# Patient Record
Sex: MALE | Race: WHITE | URBAN - METROPOLITAN AREA
[De-identification: names, ages, dates, MRNs, and addresses within clinical notes are randomized per-mention and may not be internally consistent; named-entity substitution may affect disease eponyms.]

---

## 2017-08-23 ENCOUNTER — OFFICE VISIT (OUTPATIENT)
Dept: INTERNAL MEDICINE CLINIC | Age: 32
End: 2017-08-23

## 2017-08-23 VITALS
RESPIRATION RATE: 16 BRPM | WEIGHT: 193 LBS | DIASTOLIC BLOOD PRESSURE: 80 MMHG | HEIGHT: 73 IN | BODY MASS INDEX: 25.58 KG/M2 | SYSTOLIC BLOOD PRESSURE: 128 MMHG | HEART RATE: 74 BPM

## 2017-08-23 DIAGNOSIS — F51.02 ADJUSTMENT INSOMNIA: ICD-10-CM

## 2017-08-23 DIAGNOSIS — Z00.00 ANNUAL PHYSICAL EXAM: Primary | ICD-10-CM

## 2017-08-23 PROCEDURE — 99385 PREV VISIT NEW AGE 18-39: CPT | Performed by: INTERNAL MEDICINE

## 2017-08-23 RX ORDER — TRAZODONE HYDROCHLORIDE 50 MG/1
TABLET ORAL
Qty: 30 TABLET | Refills: 0 | Status: SHIPPED | OUTPATIENT
Start: 2017-08-23 | End: 2019-03-12

## 2017-09-27 ENCOUNTER — HOSPITAL ENCOUNTER (OUTPATIENT)
Dept: GENERAL RADIOLOGY | Age: 32
Discharge: HOME | End: 2017-09-27
Attending: INTERNAL MEDICINE | Admitting: INTERNAL MEDICINE

## 2017-09-27 DIAGNOSIS — Z00.00 ANNUAL PHYSICAL EXAM: ICD-10-CM

## 2017-09-27 LAB
A/G RATIO: 1.4 (ref 1.1–2.2)
ALBUMIN SERPL-MCNC: 4.5 G/DL (ref 3.4–5)
ALP BLD-CCNC: 53 U/L (ref 40–129)
ALT SERPL-CCNC: 23 U/L (ref 10–40)
ANION GAP SERPL CALCULATED.3IONS-SCNC: 17 MMOL/L (ref 3–16)
AST SERPL-CCNC: 21 U/L (ref 15–37)
BASOPHILS ABSOLUTE: 0 K/UL (ref 0–0.2)
BASOPHILS RELATIVE PERCENT: 0.5 %
BILIRUB SERPL-MCNC: 0.7 MG/DL (ref 0–1)
BUN BLDV-MCNC: 15 MG/DL (ref 7–20)
CALCIUM SERPL-MCNC: 9.6 MG/DL (ref 8.3–10.6)
CHLORIDE BLD-SCNC: 101 MMOL/L (ref 99–110)
CHOLESTEROL, TOTAL: 204 MG/DL (ref 0–199)
CO2: 25 MMOL/L (ref 21–32)
CREAT SERPL-MCNC: 0.9 MG/DL (ref 0.9–1.3)
EOSINOPHILS ABSOLUTE: 0.1 K/UL (ref 0–0.6)
EOSINOPHILS RELATIVE PERCENT: 1.9 %
GFR AFRICAN AMERICAN: >60
GFR NON-AFRICAN AMERICAN: >60
GLOBULIN: 3.2 G/DL
GLUCOSE BLD-MCNC: 89 MG/DL (ref 70–99)
HCT VFR BLD CALC: 46 % (ref 40.5–52.5)
HDLC SERPL-MCNC: 41 MG/DL (ref 40–60)
HEMOGLOBIN: 15.8 G/DL (ref 13.5–17.5)
LDL CHOLESTEROL CALCULATED: 144 MG/DL
LYMPHOCYTES ABSOLUTE: 2.8 K/UL (ref 1–5.1)
LYMPHOCYTES RELATIVE PERCENT: 37.9 %
MCH RBC QN AUTO: 31.1 PG (ref 26–34)
MCHC RBC AUTO-ENTMCNC: 34.3 G/DL (ref 31–36)
MCV RBC AUTO: 90.7 FL (ref 80–100)
MONOCYTES ABSOLUTE: 0.8 K/UL (ref 0–1.3)
MONOCYTES RELATIVE PERCENT: 11.3 %
NEUTROPHILS ABSOLUTE: 3.6 K/UL (ref 1.7–7.7)
NEUTROPHILS RELATIVE PERCENT: 48.4 %
PDW BLD-RTO: 13 % (ref 12.4–15.4)
PLATELET # BLD: 199 K/UL (ref 135–450)
PMV BLD AUTO: 8.7 FL (ref 5–10.5)
POTASSIUM SERPL-SCNC: 4.1 MMOL/L (ref 3.5–5.1)
RBC # BLD: 5.08 M/UL (ref 4.2–5.9)
SODIUM BLD-SCNC: 143 MMOL/L (ref 136–145)
TOTAL PROTEIN: 7.7 G/DL (ref 6.4–8.2)
TRIGL SERPL-MCNC: 94 MG/DL (ref 0–150)
VLDLC SERPL CALC-MCNC: 19 MG/DL
WBC # BLD: 7.4 K/UL (ref 4–11)

## 2019-03-12 ENCOUNTER — OFFICE VISIT (OUTPATIENT)
Dept: INTERNAL MEDICINE CLINIC | Age: 34
End: 2019-03-12
Payer: COMMERCIAL

## 2019-03-12 VITALS
HEART RATE: 72 BPM | HEIGHT: 73 IN | SYSTOLIC BLOOD PRESSURE: 124 MMHG | BODY MASS INDEX: 25.05 KG/M2 | DIASTOLIC BLOOD PRESSURE: 68 MMHG | OXYGEN SATURATION: 98 % | WEIGHT: 189 LBS

## 2019-03-12 DIAGNOSIS — F43.22 ADJUSTMENT DISORDER WITH ANXIOUS MOOD: ICD-10-CM

## 2019-03-12 DIAGNOSIS — Z00.00 ANNUAL PHYSICAL EXAM: Primary | ICD-10-CM

## 2019-03-12 LAB
A/G RATIO: 1.6 (ref 1.1–2.2)
ALBUMIN SERPL-MCNC: 4.9 G/DL (ref 3.4–5)
ALP BLD-CCNC: 58 U/L (ref 40–129)
ALT SERPL-CCNC: 23 U/L (ref 10–40)
ANION GAP SERPL CALCULATED.3IONS-SCNC: 15 MMOL/L (ref 3–16)
AST SERPL-CCNC: 16 U/L (ref 15–37)
BASOPHILS ABSOLUTE: 0 K/UL (ref 0–0.2)
BASOPHILS RELATIVE PERCENT: 0.5 %
BILIRUB SERPL-MCNC: 0.5 MG/DL (ref 0–1)
BUN BLDV-MCNC: 12 MG/DL (ref 7–20)
CALCIUM SERPL-MCNC: 10.2 MG/DL (ref 8.3–10.6)
CHLORIDE BLD-SCNC: 103 MMOL/L (ref 99–110)
CHOLESTEROL, TOTAL: 206 MG/DL (ref 0–199)
CO2: 27 MMOL/L (ref 21–32)
CREAT SERPL-MCNC: 0.9 MG/DL (ref 0.9–1.3)
EOSINOPHILS ABSOLUTE: 0.1 K/UL (ref 0–0.6)
EOSINOPHILS RELATIVE PERCENT: 1.6 %
GFR AFRICAN AMERICAN: >60
GFR NON-AFRICAN AMERICAN: >60
GLOBULIN: 3 G/DL
GLUCOSE BLD-MCNC: 99 MG/DL (ref 70–99)
HCT VFR BLD CALC: 45.7 % (ref 40.5–52.5)
HDLC SERPL-MCNC: 38 MG/DL (ref 40–60)
HEMOGLOBIN: 15.7 G/DL (ref 13.5–17.5)
LDL CHOLESTEROL CALCULATED: 140 MG/DL
LYMPHOCYTES ABSOLUTE: 1.8 K/UL (ref 1–5.1)
LYMPHOCYTES RELATIVE PERCENT: 34.5 %
MCH RBC QN AUTO: 31.4 PG (ref 26–34)
MCHC RBC AUTO-ENTMCNC: 34.4 G/DL (ref 31–36)
MCV RBC AUTO: 91.3 FL (ref 80–100)
MONOCYTES ABSOLUTE: 0.4 K/UL (ref 0–1.3)
MONOCYTES RELATIVE PERCENT: 7.5 %
NEUTROPHILS ABSOLUTE: 3 K/UL (ref 1.7–7.7)
NEUTROPHILS RELATIVE PERCENT: 55.9 %
PDW BLD-RTO: 13 % (ref 12.4–15.4)
PLATELET # BLD: 251 K/UL (ref 135–450)
PMV BLD AUTO: 9 FL (ref 5–10.5)
POTASSIUM SERPL-SCNC: 4.3 MMOL/L (ref 3.5–5.1)
RBC # BLD: 5 M/UL (ref 4.2–5.9)
SODIUM BLD-SCNC: 145 MMOL/L (ref 136–145)
TOTAL PROTEIN: 7.9 G/DL (ref 6.4–8.2)
TRIGL SERPL-MCNC: 142 MG/DL (ref 0–150)
TSH REFLEX: 3.69 UIU/ML (ref 0.27–4.2)
VLDLC SERPL CALC-MCNC: 28 MG/DL
WBC # BLD: 5.4 K/UL (ref 4–11)

## 2019-03-12 PROCEDURE — 99213 OFFICE O/P EST LOW 20 MIN: CPT | Performed by: INTERNAL MEDICINE

## 2019-03-12 PROCEDURE — 99395 PREV VISIT EST AGE 18-39: CPT | Performed by: INTERNAL MEDICINE

## 2019-03-12 RX ORDER — CITALOPRAM 20 MG/1
20 TABLET ORAL DAILY
Qty: 30 TABLET | Refills: 0 | Status: SHIPPED | OUTPATIENT
Start: 2019-03-12 | End: 2019-05-07 | Stop reason: SINTOL

## 2019-03-12 ASSESSMENT — PATIENT HEALTH QUESTIONNAIRE - PHQ9
SUM OF ALL RESPONSES TO PHQ QUESTIONS 1-9: 0
1. LITTLE INTEREST OR PLEASURE IN DOING THINGS: 0
SUM OF ALL RESPONSES TO PHQ9 QUESTIONS 1 & 2: 0
SUM OF ALL RESPONSES TO PHQ QUESTIONS 1-9: 0
2. FEELING DOWN, DEPRESSED OR HOPELESS: 0

## 2019-03-26 ENCOUNTER — TELEPHONE (OUTPATIENT)
Dept: PSYCHOLOGY | Age: 34
End: 2019-03-26

## 2019-03-26 ENCOUNTER — OFFICE VISIT (OUTPATIENT)
Dept: PSYCHOLOGY | Age: 34
End: 2019-03-26
Payer: COMMERCIAL

## 2019-03-26 DIAGNOSIS — F41.8 DEPRESSION WITH ANXIETY: Primary | ICD-10-CM

## 2019-03-26 PROCEDURE — 90791 PSYCH DIAGNOSTIC EVALUATION: CPT | Performed by: SOCIAL WORKER

## 2019-03-26 ASSESSMENT — PATIENT HEALTH QUESTIONNAIRE - PHQ9
2. FEELING DOWN, DEPRESSED OR HOPELESS: 1
SUM OF ALL RESPONSES TO PHQ QUESTIONS 1-9: 2
SUM OF ALL RESPONSES TO PHQ QUESTIONS 1-9: 2
SUM OF ALL RESPONSES TO PHQ9 QUESTIONS 1 & 2: 2
1. LITTLE INTEREST OR PLEASURE IN DOING THINGS: 1

## 2019-04-02 ENCOUNTER — OFFICE VISIT (OUTPATIENT)
Dept: INTERNAL MEDICINE CLINIC | Age: 34
End: 2019-04-02
Payer: COMMERCIAL

## 2019-04-02 VITALS
HEART RATE: 78 BPM | HEIGHT: 73 IN | WEIGHT: 189 LBS | SYSTOLIC BLOOD PRESSURE: 116 MMHG | OXYGEN SATURATION: 99 % | BODY MASS INDEX: 25.05 KG/M2 | DIASTOLIC BLOOD PRESSURE: 66 MMHG

## 2019-04-02 DIAGNOSIS — F43.22 ADJUSTMENT DISORDER WITH ANXIOUS MOOD: Primary | ICD-10-CM

## 2019-04-02 DIAGNOSIS — F51.02 ADJUSTMENT INSOMNIA: ICD-10-CM

## 2019-04-02 PROCEDURE — 99213 OFFICE O/P EST LOW 20 MIN: CPT | Performed by: INTERNAL MEDICINE

## 2019-04-02 RX ORDER — DULOXETIN HYDROCHLORIDE 30 MG/1
30 CAPSULE, DELAYED RELEASE ORAL DAILY
Qty: 30 CAPSULE | Refills: 0 | Status: SHIPPED | OUTPATIENT
Start: 2019-04-02 | End: 2019-05-07 | Stop reason: SDUPTHER

## 2019-04-02 NOTE — PROGRESS NOTES
Kaden Jorge  YOB: 1985    Date of Service:  4/2/2019    Chief Complaint:      Chief Complaint   Patient presents with    Anxiety       HPI:  Kaden Jorge is a 34 y.o. Adjustment d/o:  Not been irritated or having anger spells much with start of Celexa 20 mg qd. He is having problems with having orgasm, no problems with having an erection with it. Sleeping better with exercising more and not stay up too late playing video games. No results found for: Mary Sernancy  Lab Results   Component Value Date     03/12/2019    K 4.3 03/12/2019     03/12/2019    CO2 27 03/12/2019    BUN 12 03/12/2019    CREATININE 0.9 03/12/2019    GLUCOSE 99 03/12/2019    CALCIUM 10.2 03/12/2019     Lab Results   Component Value Date    CHOL 206 03/12/2019    TRIG 142 03/12/2019    HDL 38 03/12/2019    LDLCALC 140 03/12/2019     Lab Results   Component Value Date    ALT 23 03/12/2019    AST 16 03/12/2019     No results found for: TSH, T4FREE  Lab Results   Component Value Date    WBC 5.4 03/12/2019    HGB 15.7 03/12/2019    HCT 45.7 03/12/2019    MCV 91.3 03/12/2019     03/12/2019     No results found for: INR   No results found for: PSA   No results found for: LABURIC     Patient Active Problem List   Diagnosis    Adjustment insomnia    Adjustment disorder with anxious mood       No Known Allergies  Outpatient Medications Marked as Taking for the 4/2/19 encounter (Office Visit) with Melisa Noble MD   Medication Sig Dispense Refill    citalopram (CELEXA) 20 MG tablet Take 1 tablet by mouth daily Start with 1/2 pill for 6-8 days and increase to 1 qd as tolerated 30 tablet 0         Review of Systems: 14 systems were negative except of what was stated on HPI    Nursing note and vitals reviewed.     Vitals:    04/02/19 1024   BP: 116/66   Pulse: 78   SpO2: 99%   Weight: 189 lb (85.7 kg)   Height: 6' 1\" (1.854 m)     Wt Readings from Last 3 Encounters:   04/02/19 189 lb (85.7 kg)   03/12/19 189 lb (85.7 kg)   08/23/17 193 lb (87.5 kg)     BP Readings from Last 3 Encounters:   04/02/19 116/66   03/12/19 124/68   08/23/17 128/80     Body mass index is 24.94 kg/m². Constitutional: Patient appears well-developed and well-nourished. No distress. Head: Normocephalic and atraumatic. Neck: Normal range of motion. Neck supple. No thyroidmegaly. Cardiovascular: Normal rate, regular rhythm, normal heart sounds and intact distal pulses. Pulmonary/Chest: Effort normal and breath sounds normal. No stridor. No respiratory distress. No wheezes and no rales. Abdominal: Soft. Bowel sounds are normal. No distension and no mass. No tenderness. No rebound and no guarding. Musculoskeletal: No edema and no tenderness. Skin: No rash or erythema. Psychiatric: Normal mood and affect. Behavior is normal.     Assessment/Plan:  Thien Drake was seen today for anxiety. Diagnoses and all orders for this visit:    Adjustment disorder with anxious mood  Wean Celexa 20 mg down to 1/2 qd for 6-8 days and if still had climax issues, start on Cymbalta  Start DULoxetine (CYMBALTA) 30 MG extended release capsule; Take 1 capsule by mouth daily    Adjustment insomnia  Continue to exercise and go to bed at a regular time       Return 1 month on anxiety.

## 2019-04-16 ENCOUNTER — OFFICE VISIT (OUTPATIENT)
Dept: PSYCHOLOGY | Age: 34
End: 2019-04-16
Payer: COMMERCIAL

## 2019-04-16 DIAGNOSIS — F41.8 DEPRESSION WITH ANXIETY: Primary | ICD-10-CM

## 2019-04-16 PROCEDURE — 90832 PSYTX W PT 30 MINUTES: CPT | Performed by: SOCIAL WORKER

## 2019-04-16 ASSESSMENT — PATIENT HEALTH QUESTIONNAIRE - PHQ9
2. FEELING DOWN, DEPRESSED OR HOPELESS: 0
SUM OF ALL RESPONSES TO PHQ QUESTIONS 1-9: 0
1. LITTLE INTEREST OR PLEASURE IN DOING THINGS: 0
SUM OF ALL RESPONSES TO PHQ QUESTIONS 1-9: 0
SUM OF ALL RESPONSES TO PHQ9 QUESTIONS 1 & 2: 0

## 2019-04-16 NOTE — PATIENT INSTRUCTIONS
1.  Try \"smell the isabella and blow and the candle\" with Judeth Mediate  2. Continue with your plan to get exercise in  3. Youtube Dr. Anna Snyder 4-7-8  4. Return to see Huong Mast in 3 weeks. The Stress Response and How It Can Affect You   The stress response, or fight or flight response is the emergency reaction system of the body. It is there to keep you safe in emergencies. The stress response includes physical and thought responses to your perception of various situations. When the stress response is turned on, your body may release substances like adrenaline and cortisol. Your organs are programmed to respond in certain ways to situations that are viewed as challenging or threatening. The stress response can work against you. You can turn it on when you dont really need it and, as a result, perceive something as an emergency when its really not. It can turn on when you are just thinking about past or future events. Harmless, chronic conditions can be intensified by the stress response activating too often, with too much intensity, or for too long. Stress responses can be different for different individuals. Below is a list of some common stress related responses people have. (Keene the responses you have had in the last 2 weeks.)     Physical Responses   Muscle aches   Insomnia   ? Heart rate   Headache   Weight gain   Nausea   Constipation   Dry mouth   Muscle twitching  Weight loss   Low energy   Weakness   Tight chest   Diarrhea   Dizziness   Trembling   Stomach cramps  Chills    Hot flashes   Sweating   Pounding heart  Choking feeling  Chest pain   Leg cramps   Numb hands/feet Dry throat   Appetite change  Face flushing   ? Blood pressure  Light-headedness  Feeling faint      Troubleswallowing   Rash ?    Urination   Neck pain     Tingling hands/feet     Emotional and Thought Responses   Restlessness   Agitation   Insecurity            Worthlessness   Anxiety   Stress    Depression Hopelessness   Guilt    Defensiveness  Anger           Racing thoughts   Nightmares   Intense thinking  Sensitivity          Expecting the worst   Numbness   Lack of motivation  Mood swings             Forgetfulness   ? Concentration  Rigidity              Preoccupation  Intolerance     Behavioral Responses   Avoidance   Withdrawal   Neglect   ? Alcohol use    Smoking   ? Eating   Arguing       Poor appearance   ? Spending   Poor hygiene   ? Eating  Seeking reassurance   Nail biting   Skin picking   ? Talking        ? Body checking   Sexual problems  Foot tapping  Fidgeting Rapid walking    ? Exercise   Teeth clenching          Multitasking  Aggressive speaking       ? Fun activities  ? Sleeping       ? Relaxing activities     Seeking information     The parasympathetic nervous system in your body is designed to turn on your bodys relaxation response. Your behaviors and thinking can keep your bodys natural relaxation response from operating at its best.   Getting your body to relax on a daily basis for at least brief periods can help decrease unpleasant stress responses. Learning to relax your body, through specific breathing and relaxation exercises as well as by minimizing stressful thinking, can help your bodys natural relaxation system be more effective. Relaxation:  Diaphragmatic Breathing             ______________________________________________________________________________    1. Sit in a comfortable position    2. Place one hand on your stomach and the other on your chest    3. Try to breathe so that only your stomach rises and falls    As you inhale, concentrate on your chest remaining relatively still while your stomach rises. It may be helpful for you to imagine that your pants are too big and you need to push your stomach out to hold them up. When exhaling, allow your stomach to fall in and the air to fully escape. Inhale slowly. You may choose to hold the air in for about a second. Exhale slowly. Dont push the air out, but just let the natural pressure of your body slowly move it out. It is normal for this healthy method of breathing to feel a little awkward at first.  With practice, it will feel more natural.    4. Get your mind on your side    One other important factor in getting relaxed is your mind. Your mind and body are connected. The mind influences the body and the body influences the mind. What you do with your mind when you are trying to relax is very important. The key is to avoid thinking about stressful things. You can think about      Neutral things (e.g., counting, saying a word like calm or relax)   Pleasant things (e.g., imagining a pleasant place)    5. It is recommended that you practice 2 times per day, 10 minutes each time. Deep Breathing       What Is Deep Breathing? Deep breathing involves using your diaphragm muscle to help bring about a state of physiological relaxation. The diaphragm is a large muscle that rests across the bottom of your rib cage. When you inhale, the diaphragm muscle drops, opening up space so air can come in. When watching someone do this it looks like your stomach is filling with air. This type of breathing helps activate the part of your nervous system that controls relaxation. It can lead to decreased heart rate, blood pressure, decreased muscle tension, and overall feelings of relaxation. Why Be Concerned With How Im Breathing?  To increase your awareness of the role that breathing plays in increased physical tension and in contributing to increasing your bodys stress response.  To lower your level of stress-related arousal and tension.  To give you a method of taking calm, relaxing breaths to break the cycle of increasing arousal during stressful situations. What Is the Best Way To Use Deep Breathing Exercises?  Use deep breathing frequently.     Take deep breaths at the first signs of stress, anxiety, physical tension, or other symptoms.  Schedule time for relaxation. Deep Breathing Exercises      Exercise 1:  The Stimulating Breath (also called the Alejandra Breath)   Its aim is to raise energy level and increase alertness. - Inhale and exhale rapidly through your nose, keeping your mouth closed but relaxed. Your breaths in and out should be equal in duration, but as short as possible. This is a noisy breathing exercise. - Try for three in-and-out breath cycles per second. This produces a quick movement of the diaphragm, suggesting a alejandra. Breathe normally after each cycle. - Do not do for more than 15 seconds on your first try. Each time you practice the Stimulating Breath, you can increase your time by five seconds or so, until you reach a full minute. If done properly, you may feel invigorated, comparable to the heightened awareness you feel after a good workout. You should feel the effort at the back of the neck, the diaphragm, the chest and the abdomen. Try this breathing exercise the next time you need an energy boost and feel yourself reaching for a cup of coffee. Exercise 2:  The 4-7-8 (or Relaxing Breath) Exercise  This exercise is utterly simple, takes almost no time, requires no equipment and can be done anywhere. Although you can do the exercise in any position, sit with your back straight while learning the exercise. Place the tip of your tongue against the ridge of tissue just behind your upper front teeth, and keep it there through the entire exercise. You will be exhaling through your mouth around your tongue; try pursing your lips slightly if this seems awkward.  Exhale completely through your mouth, making a whoosh sound.  Close your mouth and inhale quietly through your nose to a mental count of four.  Hold your breath for a count of seven.  Exhale completely through your mouth, making a whoosh sound to a count of eight.  This is one breath.  Now inhale again and repeat the cycle three more times for a total of four breaths. Note that you always inhale quietly through your nose and exhale audibly through your mouth. The tip of your tongue stays in position the whole time. Exhalation takes twice as long as inhalation. The absolute time you spend on each phase is not important; the ratio of 4:7:8 is important. If you have trouble holding your breath, speed the exercise up but keep to the ratio of 4:7:8 for the three phases. With practice you can slow it all down and get used to inhaling and exhaling more and more deeply. This exercise is a natural tranquilizer for the nervous system. Unlike tranquilizing drugs, which are often effective when you first take them but then lose their power over time, this exercise is subtle when you first try it but gains in power with repetition and practice. Do it at least twice a day. You cannot do it too frequently. Do NOT do more than 4 breaths at one time for the first month of practice. Later, if you wish, you can extend it to eight breaths. If you feel a little lightheaded when you first breathe this way, do not be concerned; it will pass. Once you develop this technique by practicing it every day, it will be a very useful tool that you will always have with you. Use it whenever anything upsetting happens - before you react. Use it whenever you are aware of internal tension. Use it to help you fall asleep. This exercise cannot be recommended too highly. Everyone can benefit from it. Exercise 3: Meditation exercise  Breath Counting  If you want to get a feel for this challenging work, try your hand at breath counting, a deceptively simple technique. Sit in a comfortable position with the spine straight and head inclined slightly forward. Gently close your eyes and take a few deep breaths. Then let the breath come naturally without trying to influence it. Ideally it will be quiet and slow, but depth and rhythm may vary.    To begin the exercise, count \"one\" to yourself as you exhale.  The next time you exhale, count \"two,\" and so on up to [place]. \"   Then begin a new cycle, counting \"one\" on the next exhalation. Never count higher than \"five,\" and count only when you exhale. You will know your attention has wandered when you find yourself up to \"eight,\" \"12,\" even \"19. \"  Try to do 10 minutes of this form of meditation.

## 2019-04-16 NOTE — PROGRESS NOTES
Behavioral Health Consultation  Juliana Agee, [address]  4/16/2019  9:55 AM      Time spent with Patient: 30 minutes  This is patient's second  [facility] appointment. Reason for Consult:  anxiety  Referring Provider: Nathaly Noble MD  [address] [place] [address] [facility], [address] [place] [address]    Pt provided informed consent for the behavioral health program. Discussed with patient model of service to include the limits of confidentiality (i.e. abuse reporting, suicide intervention, etc.) and short-term intervention focused approach. Pt indicated understanding. Feedback given to PCP. S:  Pt endorses that he is doing really great, especially coming off of vacation. He has had a lot of time to reflect on things. Daughter was wonderful, trip to [place] could not have been better. He realized he needs to be more active with his daughter. The whole family has been more active. He also took some time from his carley, and has altered his sleep patterns to get to bed earlier. He is wanting more sleep, and limiting game time which helps the next day. Pt liked the PMR and found it to be really helpful. He was still able to pay attention to his mindfulness in the mornings. Pt is having a much better response to Cymbalta, no sexual side effects and is managing his anxiety. He is still being able to feel emotion. No SI/HI.        O:    MSE:    Appearance    alert, cooperative  Appetite normal  Sleep disturbance Yes  Fatigue Yes  Loss of pleasure No  Impulsive behavior No  Speech    spontaneous, normal rate and normal volume  Mood    euthymic  Affect    normal affect  Thought Content    intact  Thought Process    linear, goal directed and coherent  Associations    logical connections  Insight    Good  Judgment    Intact  Orientation    oriented to person, place, time, and general circumstances  Memory    recent and remote memory intact  Attention/Concentration    intact  Morbid ideation No  Suicide Assessment    no suicidal ideation      History:    Medications:   Current Outpatient Medications   Medication Sig Dispense Refill    DULoxetine (CYMBALTA) 30 MG extended release capsule Take 1 capsule by mouth daily 30 capsule 0    citalopram (CELEXA) 20 MG tablet Take 1 tablet by mouth daily Start with 1/2 pill for 6-8 days and increase to 1 qd as tolerated 30 tablet 0     No current facility-administered medications for this visit. Social History:   Social History     Socioeconomic History    Marital status:      Spouse name: Not on file    Number of children: Not on file    Years of education: Not on file    Highest education level: Not on file   Occupational History    Not on file   Social Needs    Financial resource strain: Not on file    Food insecurity:     Worry: Not on file     Inability: Not on file    Transportation needs:     Medical: Not on file     Non-medical: Not on file   Tobacco Use    Smoking status: Never Smoker    Smokeless tobacco: Never Used   Substance and Sexual Activity    Alcohol use: Yes     Comment: rare    Drug use: Yes     Types: Marijuana     Comment: twice a week    Sexual activity: Yes     Partners: Female   Lifestyle    Physical activity:     Days per week: Not on file     Minutes per session: Not on file    Stress: Not on file   Relationships    Social connections:     Talks on phone: Not on file     Gets together: Not on file     Attends [employer] service: Not on file     Active member of club or organization: Not on file     Attends meetings of clubs or organizations: Not on file     Relationship status: Not on file    Intimate partner violence:     Fear of current or ex partner: Not on file     Emotionally abused: Not on file     Physically abused: Not on file     Forced sexual activity: Not on file   Other Topics Concern    Not on file   Social History Narrative    Not on file       TOBACCO:   reports that he has never smoked.  He has never used smokeless tobacco.  ETOH:   reports that he drinks alcohol. Family History:   Family History   Problem Relation Age of Onset    Asthma Mother     Thyroid Disease Mother     Anxiety Disorder Mother     Depression Mother     Heart Attack Father     High Cholesterol Father     Cancer Father         skin     Alcohol Abuse Father     Other Father         AAA    Asthma Brother     Alcohol Abuse Brother     Arthritis Maternal Grandmother     Mult Sclerosis Maternal Grandfather     Other Paternal Grandmother         aortic aneurysm     PHQ Scores 4/16/2019 3/26/2019 3/12/2019   PHQ2 Score 0 2 0   PHQ9 Score 0 2 0     Interpretation of Total Score Depression Severity: 1-4 = Minimal depression, 5-9 = Mild depression, 10-14 = Moderate depression, 15-19 = Moderately severe depression, 20-27 = Severe depression      A:  Pt endorses sig improvement in mood. Good response to Cymbalta, resolved sexual SE. Improving self care, which has been impactful in managing mood. No SI/HI, insight and motivation good. Diagnosis:  Depression with anxiety      Past Diagnosis:  No past medical history on file. No diagnosis found.       Plan:  Pt interventions:  Provided handout on  anxiety and breathing, Trained in strategies for increasing balanced thinking, Discussed and set plan for behavioral activation, Trained in relaxation strategies, Trained in improving communication skills, Discussed self-care (sleep, nutrition, rewarding activities, social support, exercise), Motivational Interviewing to increase patient confidence and compliance with adhering to behavioral change plan, Motivational Interviewing to determine importance and readiness for change, Supportive techniques and Emphasized self-care as important for managing overall health      Pt Behavioral Change Plan:  See Pt Instructions

## 2019-05-07 ENCOUNTER — OFFICE VISIT (OUTPATIENT)
Dept: PSYCHOLOGY | Age: 34
End: 2019-05-07
Payer: COMMERCIAL

## 2019-05-07 ENCOUNTER — OFFICE VISIT (OUTPATIENT)
Dept: INTERNAL MEDICINE CLINIC | Age: 34
End: 2019-05-07
Payer: COMMERCIAL

## 2019-05-07 VITALS
DIASTOLIC BLOOD PRESSURE: 60 MMHG | BODY MASS INDEX: 25.58 KG/M2 | HEIGHT: 73 IN | WEIGHT: 193 LBS | SYSTOLIC BLOOD PRESSURE: 114 MMHG | OXYGEN SATURATION: 98 % | HEART RATE: 56 BPM

## 2019-05-07 DIAGNOSIS — F43.22 ADJUSTMENT DISORDER WITH ANXIOUS MOOD: ICD-10-CM

## 2019-05-07 DIAGNOSIS — F41.8 DEPRESSION WITH ANXIETY: Primary | ICD-10-CM

## 2019-05-07 PROCEDURE — 99213 OFFICE O/P EST LOW 20 MIN: CPT | Performed by: INTERNAL MEDICINE

## 2019-05-07 PROCEDURE — 90832 PSYTX W PT 30 MINUTES: CPT | Performed by: SOCIAL WORKER

## 2019-05-07 RX ORDER — DULOXETIN HYDROCHLORIDE 30 MG/1
30 CAPSULE, DELAYED RELEASE ORAL DAILY
Qty: 90 CAPSULE | Refills: 0 | Status: SHIPPED | OUTPATIENT
Start: 2019-05-07

## 2019-05-07 ASSESSMENT — PATIENT HEALTH QUESTIONNAIRE - PHQ9
SUM OF ALL RESPONSES TO PHQ9 QUESTIONS 1 & 2: 0
SUM OF ALL RESPONSES TO PHQ QUESTIONS 1-9: 0
SUM OF ALL RESPONSES TO PHQ QUESTIONS 1-9: 0
1. LITTLE INTEREST OR PLEASURE IN DOING THINGS: 0
2. FEELING DOWN, DEPRESSED OR HOPELESS: 0

## 2019-05-07 NOTE — PATIENT INSTRUCTIONS
1.  Cheggin[employer]Upward Mobility[employer]cy  2. Review the handout on radical acceptance  3. Return to see Torrie Gess in 4 weeks. One of the four options you have for any problem is \"radical acceptance\" (Mayra Manning). Radical acceptance is about accepting life on lifes terms and not resisting what you cannot or choose not to change. Radical acceptance is about saying yes to life, just as it is. Imagine that you talk with an  about leasing an apartment in a popular complex that is completely full. He agrees to call you when the two-bedroom apartment is available. You wait for months, then stop by to check with him. When you arrive, he is signing a lease agreement with a couple for a two-bedroom unit. When you confront him, he shrugs. That shouldnt happen. It isnt fair. And it did happen. The pain is the loss of an apartment that you really wanted. You may feel sad and hurt. Suffering is what you do with that pain and the interpretation you put on the pain. Suffering is optional; pain is not. Its difficult to accept what you dont want to be true. And its more difficult to not accept. Not accepting pain brings suffering. Refusing to [address] [facility] [address] often say, I cant stand this, This isnt fair, This cant be true, and It shouldnt be this way.  Its almost as if we think refusing to accept the truth will keep it from being true, or that accepting means agreeing. Accepting doesnt mean agreeing. Its exhausting to fight reality, and it doesnt work. Refusing to accept that you were fired for something you didnt do, that your friend cheated you, or that you werent accepted into the college you wanted to attend doesnt change the situation, and it adds to the pain you experience. Accepting reality is difficult when life is painful. No one wants to experience pain, disappointment, sadness, or loss. But those experiences are a part of life.  When you attempt to avoid or resist those emotions, you add suffering to your pain. You may build the emotion bigger with your thoughts or create more misery by attempting to avoid the painful emotions. You can stop suffering by practicing acceptance. Life is full of experiences, some that you enjoy and others you dislike. When you push away or attempt to avoid feelings of sadness and pain, you also diminish your ability to feel nirali. Avoidance of emotions often leads to depression and anxiety. Avoidance can also lead to destructive behaviors, such as gambling, drinking too much, overspending, eating too little or too much, and overworking. These behaviors may help avoid pain in the short run, but they only make the situation worse in the long run. Acceptance means that you can turn your resistant ruminating into accepting thoughts like, Im in this situation. I dont approve of it. I dont think its OK, but it is what it is, and I cant change that it happened.     Imagine that you are late for an important job interview. Traffic is especially congested, and you are stopped at red light after red light. Raging at the traffic lights or the drivers in front of you will not help you get to your destination sooner and will only add to your upset. Accepting the situation and doing the best you can will be less emotionally painful, and likely more effective. With acceptance you will arrive at your interview less distressed and perhaps better able to manage the situation.

## 2019-05-07 NOTE — PROGRESS NOTES
Behavioral Health Consultation  Ramiro Pope. [address]  5/7/2019  10:29 AM      Time spent with Patient: 30 minutes  This is patient's third  [facility] appointment. Reason for Consult:  anxiety  Referring Provider: Evelin Noble MD  [address] [place] [address]  [address] [place], [address] [place] [address]    Pt provided informed consent for the behavioral health program. Discussed with patient model of service to include the limits of confidentiality (i.e. abuse reporting, suicide intervention, etc.) and short-term intervention focused approach. Pt indicated understanding. Feedback given to PCP. S:  Pt has been doing really well. He has been getting more sleep, limiting his time playing games so is getting to bed earlier and this is really helping. The others he online games with are also sleeping more with less game time, so many are benefiting. Pt has not been exercising formally, but is very active with his yard. He and wife are doing well and she has many yard projects and visions, so they are implementing. He is doing really well overall, no side effects on Cymbalta. Much calmer with daughter, he is learning to be more relaxed and just go with the flow. Getting away from being so structured which helps.       O:    MSE:    Appearance    alert, cooperative  Appetite normal  Sleep disturbance No  Fatigue No  Loss of pleasure No  Impulsive behavior No  Speech    spontaneous, normal rate and normal volume  Mood    euthymic  Affect    normal affect  Thought Content    intact  Thought Process    linear, goal directed and coherent  Associations    logical connections  Insight    Good  Judgment    Intact  Orientation    oriented to person, place, time, and general circumstances  Memory    recent and remote memory intact  Attention/Concentration    intact  Morbid ideation No  Suicide Assessment    no suicidal ideation      History:    Medications:   Current Outpatient Medications   Medication Sig Dispense Refill    DULoxetine (CYMBALTA) 30 MG extended release capsule Take 1 capsule by mouth daily 30 capsule 0    citalopram (CELEXA) 20 MG tablet Take 1 tablet by mouth daily Start with 1/2 pill for 6-8 days and increase to 1 qd as tolerated 30 tablet 0     No current facility-administered medications for this visit. Social History:   Social History     Socioeconomic History    Marital status:      Spouse name: Not on file    Number of children: Not on file    Years of education: Not on file    Highest education level: Not on file   Occupational History    Not on file   Social Needs    Financial resource strain: Not on file    Food insecurity:     Worry: Not on file     Inability: Not on file    Transportation needs:     Medical: Not on file     Non-medical: Not on file   Tobacco Use    Smoking status: Never Smoker    Smokeless tobacco: Never Used   Substance and Sexual Activity    Alcohol use: Yes     Comment: rare    Drug use: Yes     Types: Marijuana     Comment: twice a week    Sexual activity: Yes     Partners: Female   Lifestyle    Physical activity:     Days per week: Not on file     Minutes per session: Not on file    Stress: Not on file   Relationships    Social connections:     Talks on phone: Not on file     Gets together: Not on file     Attends Latter day service: Not on file     Active member of club or organization: Not on file     Attends meetings of clubs or organizations: Not on file     Relationship status: Not on file    Intimate partner violence:     Fear of current or ex partner: Not on file     Emotionally abused: Not on file     Physically abused: Not on file     Forced sexual activity: Not on file   Other Topics Concern    Not on file   Social History Narrative    Not on file       TOBACCO:   reports that he has never smoked. He has never used smokeless tobacco.  ETOH:   reports that he drinks alcohol.     Family History:   Family History   Problem Relation Age of Onset    Asthma Mother    Domenica Thompson Thyroid Disease Mother     Anxiety Disorder Mother     Depression Mother     Heart Attack Father     High Cholesterol Father     Cancer Father         skin     Alcohol Abuse Father     Other Father         AAA    Asthma Brother     Alcohol Abuse Brother     Arthritis Maternal Grandmother     Mult Sclerosis Maternal Grandfather     Other Paternal Grandmother         aortic aneurysm     PHQ Scores 5/7/2019 4/16/2019 3/26/2019 3/12/2019   PHQ2 Score 0 0 2 0   PHQ9 Score 0 0 2 0     Interpretation of Total Score Depression Severity: 1-4 = Minimal depression, 5-9 = Mild depression, 10-14 = Moderate depression, 15-19 = Moderately severe depression, 20-27 = Severe depression      A:  Pt endorses significant improvement in mood. No SI/HI. Insight and motivation good. Diagnosis:  Depression with anxiety      Past Diagnosis:  No past medical history on file. No diagnosis found.       Plan:  Pt interventions:  Provided handout on  radical acceptance, Trained in strategies for increasing balanced thinking, Discussed and set plan for behavioral activation, Trained in relaxation strategies, Trained in improving communication skills, Discussed self-care (sleep, nutrition, rewarding activities, social support, exercise), Motivational Interviewing to increase patient confidence and compliance with adhering to behavioral change plan, Motivational Interviewing to determine importance and readiness for change, Established rapport and Supportive techniques      Pt Behavioral Change Plan:  See Pt Instructions

## 2019-10-29 ENCOUNTER — TELEPHONE (OUTPATIENT)
Dept: INTERNAL MEDICINE CLINIC | Age: 34
End: 2019-10-29

## 2019-11-05 ENCOUNTER — NURSE ONLY (OUTPATIENT)
Dept: INTERNAL MEDICINE CLINIC | Age: 34
End: 2019-11-05
Payer: COMMERCIAL

## 2019-11-05 DIAGNOSIS — Z23 NEED FOR INFLUENZA VACCINATION: Primary | ICD-10-CM

## 2019-11-05 PROCEDURE — 90471 IMMUNIZATION ADMIN: CPT | Performed by: INTERNAL MEDICINE

## 2019-11-05 PROCEDURE — 90756 CCIIV4 VACC ABX FREE IM: CPT | Performed by: INTERNAL MEDICINE

## 2020-08-18 ENCOUNTER — TELEPHONE (OUTPATIENT)
Dept: INTERNAL MEDICINE CLINIC | Age: 35
End: 2020-08-18

## 2020-08-18 NOTE — TELEPHONE ENCOUNTER
Records Release Form received from [facility]    Patient & spouse changing PCP outside [facility] ([facility]).     FWD to [facility] today 8/18/2020

## 2020-09-08 NOTE — TELEPHONE ENCOUNTER
Request Status (#[id number])     Date on original request: 8/18/2020   Date original request rcvd: 8/18/2020    Request logged by Facility: 8/18/2020

## 2024-11-21 ENCOUNTER — HOSPITAL ENCOUNTER (OUTPATIENT)
Age: 39
Discharge: HOME | End: 2024-11-21

## 2024-11-21 ENCOUNTER — HOSPITAL ENCOUNTER (OUTPATIENT)
Dept: GENERAL RADIOLOGY | Age: 39
Discharge: HOME | End: 2024-11-21
Payer: COMMERCIAL

## 2024-11-21 DIAGNOSIS — M54.2 CERVICAL PAIN: ICD-10-CM

## 2024-11-21 PROCEDURE — 72040 X-RAY EXAM NECK SPINE 2-3 VW: CPT
